# Patient Record
Sex: MALE | Race: WHITE | NOT HISPANIC OR LATINO | URBAN - METROPOLITAN AREA
[De-identification: names, ages, dates, MRNs, and addresses within clinical notes are randomized per-mention and may not be internally consistent; named-entity substitution may affect disease eponyms.]

---
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
## 2021-01-07 ENCOUNTER — PREPPED CHART (OUTPATIENT)
Dept: URBAN - METROPOLITAN AREA CLINIC 19 | Facility: CLINIC | Age: 21
End: 2021-01-07

## 2024-03-05 ENCOUNTER — NEW PATIENT (OUTPATIENT)
Dept: URBAN - METROPOLITAN AREA CLINIC 19 | Facility: CLINIC | Age: 24
End: 2024-03-05

## 2024-03-05 DIAGNOSIS — H52.13: ICD-10-CM

## 2024-03-05 PROCEDURE — 92015 DETERMINE REFRACTIVE STATE: CPT

## 2024-03-05 PROCEDURE — 92004 COMPRE OPH EXAM NEW PT 1/>: CPT

## 2024-03-05 ASSESSMENT — KERATOMETRY
OD_K2POWER_DIOPTERS: 41.75
OD_K1POWER_DIOPTERS: 40.75
OD_AXISANGLE_DEGREES: 21
OD_AXISANGLE2_DEGREES: 111

## 2024-03-05 ASSESSMENT — VISUAL ACUITY
OS_CC: 20/20-1
OD_CC: 20/25-1
OU_CC: 20/20

## 2024-03-05 ASSESSMENT — TONOMETRY
OD_IOP_MMHG: 21
OS_IOP_MMHG: 21